# Patient Record
Sex: FEMALE | Race: WHITE | Employment: OTHER | URBAN - METROPOLITAN AREA
[De-identification: names, ages, dates, MRNs, and addresses within clinical notes are randomized per-mention and may not be internally consistent; named-entity substitution may affect disease eponyms.]

---

## 2020-07-24 ENCOUNTER — APPOINTMENT (OUTPATIENT)
Dept: CT IMAGING | Age: 70
End: 2020-07-24
Payer: MEDICARE

## 2020-07-24 ENCOUNTER — APPOINTMENT (OUTPATIENT)
Dept: GENERAL RADIOLOGY | Age: 70
End: 2020-07-24
Payer: MEDICARE

## 2020-07-24 ENCOUNTER — HOSPITAL ENCOUNTER (EMERGENCY)
Age: 70
Discharge: HOME | End: 2020-07-24
Attending: EMERGENCY MEDICINE
Payer: MEDICARE

## 2020-07-24 VITALS
TEMPERATURE: 98 F | BODY MASS INDEX: 23.9 KG/M2 | OXYGEN SATURATION: 97 % | SYSTOLIC BLOOD PRESSURE: 119 MMHG | HEART RATE: 64 BPM | WEIGHT: 140 LBS | RESPIRATION RATE: 18 BRPM | DIASTOLIC BLOOD PRESSURE: 62 MMHG | HEIGHT: 64 IN

## 2020-07-24 LAB
ABO/RH: NORMAL
ACETAMINOPHEN LEVEL: <5 MCG/ML (ref 10–30)
ALBUMIN SERPL-MCNC: 4.5 G/DL (ref 3.5–5.2)
ALP BLD-CCNC: 69 U/L (ref 35–104)
ALT SERPL-CCNC: 61 U/L (ref 0–32)
ANGLE (CLOT STRENGTH): 65.6 DEGREE (ref 59–74)
ANION GAP SERPL CALCULATED.3IONS-SCNC: 14 MMOL/L (ref 7–16)
ANTIBODY SCREEN: NORMAL
AST SERPL-CCNC: 73 U/L (ref 0–31)
B.E.: -5 MMOL/L (ref -3–3)
BILIRUB SERPL-MCNC: 0.9 MG/DL (ref 0–1.2)
BUN BLDV-MCNC: 22 MG/DL (ref 8–23)
CALCIUM SERPL-MCNC: 9.7 MG/DL (ref 8.6–10.2)
CHLORIDE BLD-SCNC: 104 MMOL/L (ref 98–107)
CO2: 21 MMOL/L (ref 22–29)
COHB: 1 % (ref 0–1.5)
CREAT SERPL-MCNC: 1 MG/DL (ref 0.5–1)
CRITICAL: ABNORMAL
DATE ANALYZED: ABNORMAL
DATE OF COLLECTION: ABNORMAL
EPL-TEG: 0.7 % (ref 0–15)
ETHANOL: <10 MG/DL (ref 0–0.08)
G-TEG: 11.9 K D/SC (ref 4.5–11)
GFR AFRICAN AMERICAN: >60
GFR NON-AFRICAN AMERICAN: 55 ML/MIN/1.73
GLUCOSE BLD-MCNC: 158 MG/DL (ref 74–99)
HCO3: 18.2 MMOL/L (ref 22–26)
HCT VFR BLD CALC: 43.2 % (ref 34–48)
HEMOGLOBIN: 14 G/DL (ref 11.5–15.5)
HHB: 0.4 % (ref 0–5)
K (CLOTTING TIME): 0.8 MIN (ref 1–3)
LAB: ABNORMAL
LACTIC ACID: 1.9 MMOL/L (ref 0.5–2.2)
LY30 (FIBRINOLYSIS): 0.7 % (ref 0–8)
Lab: ABNORMAL
MA (MAX AMPLITUDE): 70.4 MM (ref 50–70)
MCH RBC QN AUTO: 29.5 PG (ref 26–35)
MCHC RBC AUTO-ENTMCNC: 32.4 % (ref 32–34.5)
MCV RBC AUTO: 90.9 FL (ref 80–99.9)
METHB: 0.3 % (ref 0–1.5)
MODE: ABNORMAL
O2 SATURATION: 99.8 % (ref 92–98.5)
O2HB: 98.3 % (ref 94–97)
OPERATOR ID: ABNORMAL
PATIENT TEMP: 38 C
PCO2: 29.2 MMHG (ref 35–45)
PDW BLD-RTO: 13.9 FL (ref 11.5–15)
PH BLOOD GAS: 7.41 (ref 7.35–7.45)
PLATELET # BLD: 254 E9/L (ref 130–450)
PMV BLD AUTO: 11.5 FL (ref 7–12)
PO2: 372.3 MMHG (ref 75–100)
POTASSIUM SERPL-SCNC: 3.98 MMOL/L (ref 3.5–5)
POTASSIUM SERPL-SCNC: 4.4 MMOL/L (ref 3.5–5)
R (REACTION TIME): 2.8 MIN (ref 5–10)
RBC # BLD: 4.75 E12/L (ref 3.5–5.5)
REASON FOR REJECTION: NORMAL
REJECTED TEST: NORMAL
SALICYLATE, SERUM: <0.3 MG/DL (ref 0–30)
SODIUM BLD-SCNC: 139 MMOL/L (ref 132–146)
SOURCE, BLOOD GAS: ABNORMAL
THB: 14.3 G/DL (ref 11.5–16.5)
TIME ANALYZED: 1955
TOTAL PROTEIN: 7.3 G/DL (ref 6.4–8.3)
TRICYCLIC ANTIDEPRESSANTS SCREEN SERUM: NEGATIVE NG/ML
WBC # BLD: 14.2 E9/L (ref 4.5–11.5)

## 2020-07-24 PROCEDURE — 82805 BLOOD GASES W/O2 SATURATION: CPT

## 2020-07-24 PROCEDURE — 86900 BLOOD TYPING SEROLOGIC ABO: CPT

## 2020-07-24 PROCEDURE — 70450 CT HEAD/BRAIN W/O DYE: CPT

## 2020-07-24 PROCEDURE — G0480 DRUG TEST DEF 1-7 CLASSES: HCPCS

## 2020-07-24 PROCEDURE — 36415 COLL VENOUS BLD VENIPUNCTURE: CPT

## 2020-07-24 PROCEDURE — 72128 CT CHEST SPINE W/O DYE: CPT

## 2020-07-24 PROCEDURE — 99285 EMERGENCY DEPT VISIT HI MDM: CPT

## 2020-07-24 PROCEDURE — 86850 RBC ANTIBODY SCREEN: CPT

## 2020-07-24 PROCEDURE — 71260 CT THORAX DX C+: CPT

## 2020-07-24 PROCEDURE — 85347 COAGULATION TIME ACTIVATED: CPT

## 2020-07-24 PROCEDURE — 99284 EMERGENCY DEPT VISIT MOD MDM: CPT

## 2020-07-24 PROCEDURE — 72125 CT NECK SPINE W/O DYE: CPT

## 2020-07-24 PROCEDURE — 99283 EMERGENCY DEPT VISIT LOW MDM: CPT | Performed by: SURGERY

## 2020-07-24 PROCEDURE — 84132 ASSAY OF SERUM POTASSIUM: CPT

## 2020-07-24 PROCEDURE — 6370000000 HC RX 637 (ALT 250 FOR IP): Performed by: SURGERY

## 2020-07-24 PROCEDURE — 80307 DRUG TEST PRSMV CHEM ANLYZR: CPT

## 2020-07-24 PROCEDURE — 6810039000 HC L1 TRAUMA ALERT

## 2020-07-24 PROCEDURE — 85027 COMPLETE CBC AUTOMATED: CPT

## 2020-07-24 PROCEDURE — 85384 FIBRINOGEN ACTIVITY: CPT

## 2020-07-24 PROCEDURE — 83605 ASSAY OF LACTIC ACID: CPT

## 2020-07-24 PROCEDURE — 72131 CT LUMBAR SPINE W/O DYE: CPT

## 2020-07-24 PROCEDURE — 85576 BLOOD PLATELET AGGREGATION: CPT

## 2020-07-24 PROCEDURE — 74177 CT ABD & PELVIS W/CONTRAST: CPT

## 2020-07-24 PROCEDURE — 80053 COMPREHEN METABOLIC PANEL: CPT

## 2020-07-24 PROCEDURE — 71045 X-RAY EXAM CHEST 1 VIEW: CPT

## 2020-07-24 PROCEDURE — 86901 BLOOD TYPING SEROLOGIC RH(D): CPT

## 2020-07-24 PROCEDURE — 72170 X-RAY EXAM OF PELVIS: CPT

## 2020-07-24 PROCEDURE — 6360000004 HC RX CONTRAST MEDICATION: Performed by: RADIOLOGY

## 2020-07-24 RX ORDER — METHOCARBAMOL 500 MG/1
750 TABLET, FILM COATED ORAL 4 TIMES DAILY
Status: DISCONTINUED | OUTPATIENT
Start: 2020-07-24 | End: 2020-07-25 | Stop reason: HOSPADM

## 2020-07-24 RX ORDER — KETOROLAC TROMETHAMINE 30 MG/ML
30 INJECTION, SOLUTION INTRAMUSCULAR; INTRAVENOUS ONCE
Status: DISCONTINUED | OUTPATIENT
Start: 2020-07-24 | End: 2020-07-25 | Stop reason: HOSPADM

## 2020-07-24 RX ORDER — ACETAMINOPHEN 500 MG
1000 TABLET ORAL
Status: DISCONTINUED | OUTPATIENT
Start: 2020-07-24 | End: 2020-07-25 | Stop reason: HOSPADM

## 2020-07-24 RX ORDER — FENTANYL CITRATE 50 UG/ML
INJECTION, SOLUTION INTRAMUSCULAR; INTRAVENOUS
Status: DISPENSED
Start: 2020-07-24 | End: 2020-07-25

## 2020-07-24 RX ORDER — ACETAMINOPHEN 325 MG/1
650 TABLET ORAL
Status: DISCONTINUED | OUTPATIENT
Start: 2020-07-24 | End: 2020-07-24

## 2020-07-24 RX ORDER — SODIUM CHLORIDE 0.9 % (FLUSH) 0.9 %
10 SYRINGE (ML) INJECTION PRN
Status: DISCONTINUED | OUTPATIENT
Start: 2020-07-24 | End: 2020-07-25 | Stop reason: HOSPADM

## 2020-07-24 RX ADMIN — ACETAMINOPHEN 1000 MG: 500 TABLET ORAL at 20:40

## 2020-07-24 RX ADMIN — IOPAMIDOL 90 ML: 755 INJECTION, SOLUTION INTRAVENOUS at 20:40

## 2020-07-24 RX ADMIN — METHOCARBAMOL TABLETS 750 MG: 500 TABLET, COATED ORAL at 20:40

## 2020-07-24 ASSESSMENT — PAIN SCALES - GENERAL: PAINLEVEL_OUTOF10: 8

## 2020-07-24 NOTE — ED NOTES
Patient log rolled while maintaining spinal precautions, no step-off deformities, patient complains of lower cervical and mid-thoracic tenderness reported     Marty Whitney RN  07/24/20 1956

## 2020-07-24 NOTE — ED PROVIDER NOTES
HPI:  7/24/20, Time: 1947. Zamzam Caal is a 81 y.o. female presenting to the ED as a trauma alert s/p fall from horse, beginning about an hour or so ago. She had short period of LOC. Not on blood thinners. Reports some right sided chest wall pain and some back pain. The complaint has been persistent,moderate in severity and worsened by nothing. She has no other complaints at this time. Please note, this patient arrived as a Trauma Alert and care of patient immediately assumed by trauma services upon arrival.     Initial evaluation occurred with trauma services at bedside. This patients disposition will be determined by trauma services. Glascow Coma Scale at time of initial examination  Best Eye Response 4 - Opens eyes on own   Best Verbal Response 5 - Alert and oriented   Best Motor Response 6 - Follows simple motor commands   Total 15     ROS:   Pertinent positives and negatives are stated within HPI, all other systems reviewed and are negative.    --------------------------------------------- PAST HISTORY ---------------------------------------------  Past Medical History:  has no past medical history on file. Past Surgical History:  has no past surgical history on file. Social History:      Family History: family history is not on file. The patients home medications have been reviewed. Allergies: Patient has no allergy information on record.            ------------------------- NURSING NOTES AND VITALS REVIEWED ---------------------------   The nursing notes within the ED encounter and vital signs as below have been reviewed. /62   Pulse 64   Temp 97.5 °F (36.4 °C)   Resp 18   Ht 5' 4\" (1.626 m)   Wt 140 lb (63.5 kg)   SpO2 97%   BMI 24.03 kg/m²   Oxygen Saturation Interpretation: Normal    The patients available past medical records and past encounters were reviewed.           -------------------------------------------------- RESULTS -------------------------------------------------    LABS:  Results for orders placed or performed during the hospital encounter of 07/24/20   Blood Gas, Arterial   Result Value Ref Range    Date Analyzed [id number]     Time Analyzed 1955     Source: Blood Arterial     pH, Blood Gas 7.413 7.350 - 7.450    PCO2 29.2 (L) 35.0 - 45.0 mmHg    PO2 372.3 (H) 75.0 - 100.0 mmHg    HCO3 18.2 (L) 22.0 - 26.0 mmol/L    B.E. -5.0 (L) -3.0 - 3.0 mmol/L    O2 Sat 99.8 (H) 92.0 - 98.5 %    O2Hb 98.3 (H) 94.0 - 97.0 %    COHb 1.0 0.0 - 1.5 %    MetHb 0.3 0.0 - 1.5 %    HHb 0.4 0.0 - 5.0 %    tHb (est) 14.3 11.5 - 16.5 g/dL    Potassium 3.98 3.50 - 5.00 mmol/L    Mode NRB 15L     Date Of Collection      Time Collected      Pt Temp 38.0 C     ID [id number]     Lab [id number]     Critical(s) Notified .  No Critical Values    TEG lab test   Result Value Ref Range    R (Reaction Time) 2.8 (L) 5.0 - 10.0 min    K (Clotting Time) 0.8 (L) 1.0 - 3.0 min    Angle (Clot Strength) 65.6 59.0 - 74.0 degree    MA (Max Amplitude) 70.4 (H) 50.0 - 70.0 mm    G-TEG 11.9 (H) 4.5 - 11.0 K d/sc    EPL-TEG 0.7 0.0 - 15.0 %    LY30 (Fibrinolysis) 0.7 0.0 - 8.0 %   Comprehensive Metabolic Panel   Result Value Ref Range    Sodium 139 132 - 146 mmol/L    Potassium 4.4 3.5 - 5.0 mmol/L    Chloride 104 98 - 107 mmol/L    CO2 21 (L) 22 - 29 mmol/L    Anion Gap 14 7 - 16 mmol/L    Glucose 158 (H) 74 - 99 mg/dL    BUN 22 8 - 23 mg/dL    CREATININE 1.0 0.5 - 1.0 mg/dL    GFR Non-African American 55 >=60 mL/min/1.73    GFR African American >60     Calcium 9.7 8.6 - 10.2 mg/dL    Total Protein 7.3 6.4 - 8.3 g/dL    Alb 4.5 3.5 - 5.2 g/dL    Total Bilirubin 0.9 0.0 - 1.2 mg/dL    Alkaline Phosphatase 69 35 - 104 U/L    ALT 61 (H) 0 - 32 U/L    AST 73 (H) 0 - 31 U/L   CBC   Result Value Ref Range    WBC 14.2 (H) 4.5 - 11.5 E9/L    RBC 4.75 3.50 - 5.50 E12/L    Hemoglobin 14.0 11.5 - 15.5 g/dL    Hematocrit 43.2 34.0 - 48.0 %    MCV 90.9 80.0 - 99.9 fL    MCH 29.5 26.0 - 35.0 pg    MCHC 32.4 32.0 - 34.5 %    RDW 13.9 11.5 - 15.0 fL    Platelets 429 654 - 706 E9/L    MPV 11.5 7.0 - 12.0 fL   Lactic Acid, Plasma   Result Value Ref Range    Lactic Acid 1.9 0.5 - 2.2 mmol/L   Serum Drug Screen   Result Value Ref Range    Ethanol Lvl <10 mg/dL    Acetaminophen Level <5.0 (L) 10.0 - 99.3 mcg/mL    Salicylate, Serum <1.8 0.0 - 30.0 mg/dL    TCA Scrn NEGATIVE Cutoff:300 ng/mL   SPECIMEN REJECTION   Result Value Ref Range    Rejected Test teg     Reason for Rejection see below    TYPE AND SCREEN   Result Value Ref Range    ABO/Rh O POS     Antibody Screen NEG        RADIOLOGY:  Interpreted by Radiologist.  CT HEAD WO CONTRAST   Final Result   Negative noncontrast CT study. Specifically, there is no evidence of intracranial hemorrhage or mass   effect, and no calvarial traumatic findings are noted. CT CERVICAL SPINE WO CONTRAST   Final Result      No acute fracture or spondylolisthesis is identified on CT of cervical   spine. Diffuse degenerative disc and facet disease result in central and   bilateral foraminal stenosis at C5-6. CT THORACIC SPINE WO CONTRAST   Final Result      No acute fracture or spondylolisthesis is identified on CT of thoracic   spine. Diffuse degenerative disc and facet disease result in no severe   central or foraminal stenosis. CT CHEST W CONTRAST   Final Result   No indication for an acute trauma injury to the   intrathoracic structures or to the bone structures of the chest wall. CT ABDOMEN PELVIS W IV CONTRAST Additional Contrast? None   Final Result   No indication for an acute trauma injury to the   intraperitoneal and retroperitoneal structures. No acute displaced   fractures in the lumbar sacral spine and pelvic bones including hip   joints. CT LUMBAR SPINE WO CONTRAST   Final Result      No acute fracture or spondylolisthesis is identified on CT of lumbar   spine.       Diffuse degenerative disc and facet disease result in multilevel   central and foraminal stenosis. Atherosclerotic vascular disease. XR CHEST 1 VIEW   Final Result      Negative one view chest.      XR PELVIS (1-2 VIEWS)   Final Result      No acute fracture is identified. Osteoarthritis.              ---------------------------------------------------PHYSICAL EXAM--------------------------------------      Primary Survey:  Airway: patient, trachea midline,   Breathing: Spontaneous, breath sounds equal bilaterally, symmetric cehst rise  Circulation: 2+ femoral pulses, 2+ DP/PT pulses  Disability: GCS 15      Constitutional/General: Alert and oriented x3, well appearing, non toxic in NAD  Head: NC/AT  Eyes: PERRL, EOMI  Ears: TMs clear with no hemotympanum  Mouth: Oropharynx clear, handling secretions, no trismus. No dental trauma, no oral trauma  Neck: Immobilized in cervical collar. No crepitus, no palpable lacerations, abrasions, deformities, or stepoffs. Back: No midline cervical, thoracic, lumbar spine tenderness. No Stepoffs, abrasions, lacerations, or deformities. Pulmonary: Lungs clear to auscultation bilaterally, no wheezes, rales, or rhonchi. Not in respiratory distress  Cardiovascular:  Regular rate and rhythm, no murmurs, gallops, or rubs. 2+ distal pulses  Abdomen: Soft, non tender, non distended, +BS, no rebound, guarding, or rigidity. No pulsatile masses appreciated  Extremities: Moves all extremities x 4. Warm and well perfused, no clubbing, cyanosis, or edema.  Capillary refill <3 seconds  Skin: warm and dry without rash  Neurologic: GCS 15, CN 2-12 grossly intact, no focal deficits, symmetric strength 5/5 in the upper and lower extremities bilaterally  Psych: Normal Affect    Trauma Evaluation/Survey Conducted in accordance with ATLS Guidelines      ------------------------------ ED COURSE/MEDICAL DECISION MAKING----------------------  Medications   methocarbamol (ROBAXIN) tablet 750 mg (750 mg Oral Given 7/24/20 2040) acetaminophen (TYLENOL) tablet 1,000 mg (1,000 mg Oral Given 7/24/20 2040)   sodium chloride flush 0.9 % injection 10 mL (has no administration in time range)   ketorolac (TORADOL) injection 30 mg (has no administration in time range)   iopamidol (ISOVUE-370) 76 % injection 90 mL (90 mLs Intravenous Given 7/24/20 2040)         Medical Decision Making:    Per trauma    Re-Evaluations:             Re-evaluation. Patients symptoms are improving      Consultations:             trauma    Critical Care: none        This patient's ED course included: re-evaluation prior to disposition, cardiac monitoring, continuous pulse oximetry and a personal history and physicial eaxmination    This patient has remained hemodynamically stable and improved during their ED course.   --------------------------------- IMPRESSION AND DISPOSITION ---------------------------------    IMPRESSION  1. Concussion with loss of consciousness of 30 minutes or less, initial encounter    2.  Contusion of right chest wall, initial encounter        DISPOSITION  Disposition: as per consultation   Patient condition is stable         Ray Barone DO  07/24/20 4839

## 2020-07-25 NOTE — H&P
TRAUMA HISTORY & PHYSICAL  Surgical Resident/Advance Practice Nurse  7/24/2020  8:05 PM    PRIMARY SURVEY    CHIEF COMPLAINT:  Trauma alert. Injury occurred just prior to arrival:  Marfannyu Isi off of a running horse. +LOC, no headache/nausea/vomiting. Complained of mild neck pain and moderate to severe thoracic spine pain. But no numbness or neurodeficits per patient and EMS. AIRWAY:   Airway Normal  EMS ETT Absent  Noisy respirations Absent  Retractions: Absent  Vomiting/bleeding: Absent    BREATHING:    Midaxillary breath sound left:  Normal  Midaxillary breath sound right:  Normal    Cough sound intensity:  --  FiO2: 15 liters/min via non-rebreather face mask  SMI 2250mL    CIRCULATION:   Femerol pulse intensity: Strong  Palpebral conjunctiva: Pink       Vitals:    07/24/20 1959   BP: 135/78   Pulse:    Resp: 20   Temp:    SpO2:        Vitals:    07/24/20 1949 07/24/20 1951 07/24/20 1954 07/24/20 1959   BP:    135/78   Pulse: 83  77    Resp: 20  20 20   Temp: 97.5 °F (36.4 °C)      SpO2: 100%  100%    Weight:  140 lb (63.5 kg)     Height:  5' 4\" (1.626 m)          FAST EXAM: none    Central Nervous System    GCS Initial 15 minutes   Eye  Motor  Verbal 4 - Opens eyes on own  6 - Follows simple motor commands  5 - Alert and oriented 4 - Opens eyes on own  6 - Follows simple motor commands  5 - Alert and oriented     Neuromuscular blockade: No  Pupil size:  Left 3 mm    Right 3 mm  Pupil reaction: Yes    Wiggles fingers:   Left Yes   Right Yes  Wiggles toes:   Left Yes     Right Yes    Hand grasp:   Left  Present      Right  Present  Plantar flexion: Left  Present      Right   Present    Loss of consciousness:  yes  History Obtained From:  Patient & EMS  Private Medical Doctor: -    Pre-exisiting Medical History:  yes    Conditions: diabetes, stroke 2 yrs ago, HLD    Medications: plavix, genumet, cholesterol med    Allergies:   Patient has no allergy information on record.     Social History:   Tobacco use: no  Alcohol use:  occasional  Illicit drug use:  no  Social History    None         Past Surgical History:  Hysterectomy, tubal    Anticoagulant use:  no  Antiplatelet use:    yes    NSAID use in last 72 hours: no  Taken PCN in past: yes  Last food/drink: -  Last tetanus: <10yr ago    Family History:   Not pertinent to presenting problem. Complaints:   Head:  None  Neck:   Mild  Chest:   None  Back:   Moderate  Abdomen:   None  Extremities:   None  Comments:     Review of systems:  All negative unless otherwise noted. SECONDARY SURVEY  Head/scalp: Atraumatic    Face: Atraumatic    Eyes/ears/nose: Atraumatic    Pharynx/mouth: Atraumatic    Neck: Atraumatic     Cervical spine: Cervical collar in place at time of arrival  Pain:  mild  Tenderness: lower cervical spine  ROM:  Not indicated        Chest wall:  Atraumatic  Tenderness: right lateral ribs    Heart:  Regular rate & rhythm    Abdomen: Atraumatic. Soft ND  Tenderness:  none    Pelvis: Atraumatic  Tenderness: none    Thoracolumbar spine: Atraumatic  Tenderness:  Mid thoracic    Genitourinary:  Atraumatic. No blood or urine noted    Rectum: deferred        Perineum: Atraumatic. No blood or urine noted. Extremities:   Sensory normal  Motor normal    Distal Pulses   Left arm normal  Right arm normal  Left leg normal  Right leg normal    Capillary refill  Left arm normal  Right arm normal  Left leg normal  Right leg normal    Procedures in ED:  Femoral arterial puncture    In the event of Emergency Blood Transfusion:  Due to the critical condition of this patient, I request the immediate release of blood products for emergency transfusion secondary to shock. I understand the increased risks incurred by the lack of complete transfusion testing.       Radiology: Chest Xray, Pelvic Xray, Ct head, Ct cervical spine, CT chest, CT abdomen/pelvis  CT Thoracic  and CT Lumbar     Consultations:  pending images/labs    Admission/Diagnosis: pending images/labs    Plan of Treatment: pending images/labs    Plan to be discussed with Dr. Colby Morales at 7/24/2020     Electronically signed by Loree Harris MD on 7/24/2020 at 8:05 PM

## 2020-07-25 NOTE — PROGRESS NOTES
the bone structures of the chest wall. CT ABDOMEN PELVIS W IV CONTRAST Additional Contrast? None   Final Result   No indication for an acute trauma injury to the   intraperitoneal and retroperitoneal structures. No acute displaced   fractures in the lumbar sacral spine and pelvic bones including hip   joints. CT LUMBAR SPINE WO CONTRAST   Final Result      No acute fracture or spondylolisthesis is identified on CT of lumbar   spine. Diffuse degenerative disc and facet disease result in multilevel   central and foraminal stenosis. Atherosclerotic vascular disease. XR CHEST 1 VIEW   Final Result      Negative one view chest.      XR PELVIS (1-2 VIEWS)   Final Result      No acute fracture is identified. Osteoarthritis. PHYSICAL EXAM:   GCS:  4 - Opens eyes on own   6 - Follows simple motor commands  5 - Alert and oriented    Pupil size: Left 3 mm     Right 3 mm  Pupil reaction: Yes  Wiggles fingers: Left Yes     Right Yes  Wiggles toes: Left Yes     Right Yes  Plantar flexion: Left normal     Right normal    GENERAL:  NAD. A&Ox3. HEAD:  Normocephalic, atraumatic. LUNGS:  No increased work of breathing. Room air. CARDIOVASCULAR: Normal rate, regular rhythm  CHEST WALL: tender right lateral lower chest wall without ecchymosis/hematoma  ABDOMEN:  Soft, non-distended, completely non-tender. No guarding, rigidity, rebound. EXTREMITIES:  MAEx4. No LE edema. Atraumatic. SKIN:  Warm and dry     Normal gait, no lightheadedness      Spine:    c-collar was cleared: no pain/paresthesia w/ palpation & full active ROM.  CT negative for fracture    Spine Tenderness ROM   Cervical 0 /10 Normal   Thoracic 0 /10 Normal   Lumbar 0 /10 Normal     Musculoskeletal:    Joint Tenderness Swelling/Deformity ROM   Right shoulder absent absent normal   Left shoulder absent absent normal   Right elbow absent absent normal   Left elbow absent absent normal   Right wrist absent absent normal   Left wrist absent absent normal   Right hand grasp absent absent normal   Left hand grasp absent absent normal   Right hip absent absent normal   Left hip absent absent normal   Right knee absent absent normal   Left knee absent absent normal   Right ankle absent absent normal   Left ankle absent absent normal   Right foot absent absent normal   Left foot absent absent normal         CONSULTS: none      Active Problems:    * No active hospital problems. *  Resolved Problems:    * No resolved hospital problems. *        Assessment/Plan:     Neuro:  Concussion, no horseriding until cleared by physician at home (she lives out of town). GCS 15. Pain control. CV: No acute issues. Pulm: No acute issues. GI: No acute issues. Renal: No acute issues. Endocrine: No acute issues. MSK: right lateral chest wall contusion without external signs or rib fractures  Heme: No acute issues. ID: No acute issues.     Pain/Analgesia: OTC  Bowel Regimen: none  DVT PPx:  ambulate  GI PPx:  none    Disposition:  home    [place]bania Guevara MD  7/24/2020  10:47 PM

## 2024-08-20 NOTE — ED NOTES
Arrived in [facility], [address] [place] [address]  07/24/20 1946 The patient is Stable - Low risk of patient condition declining or worsening    Shift Goals  Clinical Goals: maintian intact nureo status, nuero checks  Patient Goals: keep warm, blankets  Family Goals: gisele    Progress made toward(s) clinical / shift goals:    Problem: Pain - Standard  Goal: Alleviation of pain or a reduction in pain to the patient’s comfort goal  Outcome: Progressing  Note: Patient rates pain 0/10. Comfort and extra blankets in place.      Problem: Knowledge Deficit - Standard  Goal: Patient and family/care givers will demonstrate understanding of plan of care, disease process/condition, diagnostic tests and medications  Outcome: Progressing  Note: POC discussed with patient. Patient verbalizes understanding.     Problem: Skin Integrity  Goal: Skin integrity is maintained or improved  Outcome: Progressing  Note: Barrier creme and wipes used after incontinence. Q2 turns in place.        Patient is not progressing towards the following goals: